# Patient Record
Sex: FEMALE | Race: WHITE
[De-identification: names, ages, dates, MRNs, and addresses within clinical notes are randomized per-mention and may not be internally consistent; named-entity substitution may affect disease eponyms.]

---

## 2017-11-21 NOTE — ULT
PRELIMINARY REPORT/[facility]/EMERGENCY AFTER

HOURS PROCEDURE:

 

EXAM:

US Duplex Left Lower Extremity Veins

 

CLINICAL HISTORY:

27 years old, female; Pain; Leg, upper; Left; Patient HX: HX: Dvt in left calf (2011)

 

TECHNIQUE:

Real-time ultrasound scan of the veins of the left lower extremity with color Doppler flow, spectral 
waveform analysis and compression.

 

COMPARISON:

No relevant prior studies available.

 

FINDINGS:

Deep veins: Unremarkable. No DVT in the visualized common femoral, femoral, proximal deep femoral or 
popliteal veins. The veins demonstrate normal color flow, are normally compressible, with normal phas
ic flow and/or augmentation response.

Superficial veins: Unremarkable. No thrombus in the visualized great saphenous vein.

Soft tissues: No acute findings. No popliteal cyst.

 

IMPRESSION:

No sonographic evidence for deep venous thrombosis in the left lower extremity.

 

Thank you for allowing us to participate in the care of your patient.

 

Dictated and Authenticated by: Hanny Shultz MD

11/21/2017 12:24 AM Central Time ([place] & [place])

 

 

 

                              FINAL REPORT 

 

LEFT LOWER EXTREMITY VENOUS DUPLEX ULTRASOUND INCLUDING COLOR AND SPECTRAL DOPPLER IMAGING:

EMERGENT AFTER HOURS EXAM

 

TIME: 11:48 p.m.

DATE: 11/20/17.

 

Exam performed from groin to ankle including visualized greater saphenous, common femoral, superficia
l femoral, profunda femoral, popliteal, trifurcation, and posterior tibial vein regions.  Phasic flow
 noted at all levels.  No evidence of deep venous thrombosis.

 

POS: JOB

## 2018-06-15 ENCOUNTER — HOSPITAL ENCOUNTER (OUTPATIENT)
Dept: HOSPITAL 92 - BICRAD | Age: 28
Discharge: HOME | End: 2018-06-15
Attending: INTERNAL MEDICINE
Payer: COMMERCIAL

## 2018-06-15 DIAGNOSIS — M47.894: ICD-10-CM

## 2018-06-15 DIAGNOSIS — M54.5: Primary | ICD-10-CM

## 2018-06-15 PROCEDURE — 72072 X-RAY EXAM THORAC SPINE 3VWS: CPT

## 2018-06-15 PROCEDURE — 72100 X-RAY EXAM L-S SPINE 2/3 VWS: CPT

## 2019-03-26 ENCOUNTER — HOSPITAL ENCOUNTER (OUTPATIENT)
Dept: HOSPITAL 92 - BICULT | Age: 29
Discharge: HOME | End: 2019-03-26
Attending: PHYSICIAN ASSISTANT
Payer: COMMERCIAL

## 2019-03-26 DIAGNOSIS — N63.10: Primary | ICD-10-CM

## 2019-03-26 NOTE — ULT
RIGHT BREAST ULTRASOUND:

LEFT BREAST ULTRASOUND:

 

HISTORY:

A 28-year-old female with a breast lump.

 

TECHNIQUE:

Bilateral breast ultrasound was performed.

 

FINDINGS:

There is ductal ectasia noted bilaterally without intraductal mass.

 

There is a 5 mm cyst at the 6 o'clock position of the left breast, 3 cm from the nipple.

 

IMPRESSION:

BI-RADS category 2-Benign findings.  Age appropriate mammographic screening is recommended, based on 
risk factors and clinical findings.

 

POS: [facility]

## 2019-07-31 ENCOUNTER — HOSPITAL ENCOUNTER (OUTPATIENT)
Dept: HOSPITAL 92 - SCSULT | Age: 29
Discharge: HOME | End: 2019-07-31
Attending: FAMILY MEDICINE
Payer: COMMERCIAL

## 2019-07-31 DIAGNOSIS — R60.9: Primary | ICD-10-CM

## 2019-07-31 NOTE — ULT
LEFT LOWER EXTREMITY VENOUS DOPPLER WITH SPECTRAL ANALYSIS AND COLOR FLOW EVALUATION: 

7/31/19

 

HISTORY: 

Left lower extremity edema. History of prior DVT. Patient currently on  Coumadin. 

 

COMPARISON: 

11/20/17.

 

FINDINGS: 

Gray scale, color flow, Doppler evaluation, and spectral analysis of the left lower extremity venous 
structures is performed with 2D imaging. The left lower extremity common femoral, superficial femoral
, popliteal, posterior tibial, most proximal greater saphenous and profunda femoral   veins are image
d. 

 

There is normal lumen compressibility, flow and augmentation of the visualized deep venous structures
 left lower extremity. There has been no interval change compared to prior study in 2017. 

 

IMPRESSION: 

No evidence of a DVT involving the visualized deep venous structures left lower extremity. 

 

POS: [facility]

## 2021-05-06 ENCOUNTER — HOSPITAL ENCOUNTER (OUTPATIENT)
Dept: HOSPITAL 92 - CSHLAB | Age: 31
Discharge: HOME | End: 2021-05-06
Attending: OBSTETRICS & GYNECOLOGY
Payer: COMMERCIAL

## 2021-05-06 DIAGNOSIS — Z20.822: Primary | ICD-10-CM

## 2021-05-06 PROCEDURE — U0005 INFEC AGEN DETEC AMPLI PROBE: HCPCS

## 2021-05-06 PROCEDURE — 87635 SARS-COV-2 COVID-19 AMP PRB: CPT

## 2021-05-06 PROCEDURE — U0003 INFECTIOUS AGENT DETECTION BY NUCLEIC ACID (DNA OR RNA); SEVERE ACUTE RESPIRATORY SYNDROME CORONAVIRUS 2 (SARS-COV-2) (CORONAVIRUS DISEASE [COVID-19]), AMPLIFIED PROBE TECHNIQUE, MAKING USE OF HIGH THROUGHPUT TECHNOLOGIES AS DESCRIBED BY CMS-2020-01-R: HCPCS

## 2021-05-09 ENCOUNTER — HOSPITAL ENCOUNTER (INPATIENT)
Dept: HOSPITAL 92 - CSHLD | Age: 31
LOS: 4 days | Discharge: HOME | End: 2021-05-13
Attending: OBSTETRICS & GYNECOLOGY | Admitting: OBSTETRICS & GYNECOLOGY
Payer: COMMERCIAL

## 2021-05-09 VITALS — BODY MASS INDEX: 33.5 KG/M2

## 2021-05-09 DIAGNOSIS — Z79.01: ICD-10-CM

## 2021-05-09 DIAGNOSIS — Z86.718: ICD-10-CM

## 2021-05-09 DIAGNOSIS — Z3A.39: ICD-10-CM

## 2021-05-09 DIAGNOSIS — Z88.8: ICD-10-CM

## 2021-05-09 DIAGNOSIS — D68.52: ICD-10-CM

## 2021-05-09 DIAGNOSIS — Z20.822: ICD-10-CM

## 2021-05-09 DIAGNOSIS — Z88.1: ICD-10-CM

## 2021-05-09 LAB
HBSAG INDEX: 0.17 S/CO (ref 0–0.99)
HGB BLD-MCNC: 11.7 G/DL (ref 12–15.5)
MCH RBC QN AUTO: 29.7 PG (ref 27–33)
MCV RBC AUTO: 86.3 FL (ref 81.6–98.3)
PLATELET # BLD AUTO: 191 10X3/UL (ref 150–450)
RBC # BLD AUTO: 3.94 10X6/UL (ref 3.9–5.03)
SYPHILIS ANTIBODY INDEX: 0.02 S/CO
WBC # BLD AUTO: 11.5 10X3/UL (ref 3.5–10.5)

## 2021-05-09 PROCEDURE — 87635 SARS-COV-2 COVID-19 AMP PRB: CPT

## 2021-05-09 PROCEDURE — 86901 BLOOD TYPING SEROLOGIC RH(D): CPT

## 2021-05-09 PROCEDURE — 86850 RBC ANTIBODY SCREEN: CPT

## 2021-05-09 PROCEDURE — 86900 BLOOD TYPING SEROLOGIC ABO: CPT

## 2021-05-09 PROCEDURE — U0003 INFECTIOUS AGENT DETECTION BY NUCLEIC ACID (DNA OR RNA); SEVERE ACUTE RESPIRATORY SYNDROME CORONAVIRUS 2 (SARS-COV-2) (CORONAVIRUS DISEASE [COVID-19]), AMPLIFIED PROBE TECHNIQUE, MAKING USE OF HIGH THROUGHPUT TECHNOLOGIES AS DESCRIBED BY CMS-2020-01-R: HCPCS

## 2021-05-09 PROCEDURE — 87340 HEPATITIS B SURFACE AG IA: CPT

## 2021-05-09 PROCEDURE — 36415 COLL VENOUS BLD VENIPUNCTURE: CPT

## 2021-05-09 PROCEDURE — 51702 INSERT TEMP BLADDER CATH: CPT

## 2021-05-09 PROCEDURE — U0005 INFEC AGEN DETEC AMPLI PROBE: HCPCS

## 2021-05-09 PROCEDURE — 86780 TREPONEMA PALLIDUM: CPT

## 2021-05-09 PROCEDURE — 85027 COMPLETE CBC AUTOMATED: CPT

## 2021-05-09 PROCEDURE — 3E0P7VZ INTRODUCTION OF HORMONE INTO FEMALE REPRODUCTIVE, VIA NATURAL OR ARTIFICIAL OPENING: ICD-10-PCS | Performed by: OBSTETRICS & GYNECOLOGY

## 2021-05-09 PROCEDURE — 3E0DXGC INTRODUCTION OF OTHER THERAPEUTIC SUBSTANCE INTO MOUTH AND PHARYNX, EXTERNAL APPROACH: ICD-10-PCS | Performed by: OBSTETRICS & GYNECOLOGY

## 2021-05-10 RX ADMIN — Medication SCH MLS: at 08:57

## 2021-05-10 RX ADMIN — Medication SCH MLS: at 16:51

## 2021-05-10 RX ADMIN — Medication SCH MLS: at 00:56

## 2021-05-10 RX ADMIN — Medication SCH MLS: at 04:58

## 2021-05-10 RX ADMIN — Medication SCH ML: at 10:22

## 2021-05-10 RX ADMIN — Medication SCH MLS: at 13:27

## 2021-05-10 RX ADMIN — Medication SCH ML: at 22:54

## 2021-05-10 RX ADMIN — Medication SCH MLS: at 21:00

## 2021-05-10 RX ADMIN — Medication SCH ML: at 16:34

## 2021-05-11 PROCEDURE — 0KQM0ZZ REPAIR PERINEUM MUSCLE, OPEN APPROACH: ICD-10-PCS | Performed by: OBSTETRICS & GYNECOLOGY

## 2021-05-11 RX ADMIN — Medication SCH: at 07:57

## 2021-05-11 RX ADMIN — HYDROCODONE BITARTRATE AND ACETAMINOPHEN PRN TAB: 5; 325 TABLET ORAL at 18:24

## 2021-05-11 RX ADMIN — HYDROCODONE BITARTRATE AND ACETAMINOPHEN PRN TAB: 5; 325 TABLET ORAL at 09:26

## 2021-05-11 RX ADMIN — DOCUSATE CALCIUM SCH MG: 240 CAPSULE, LIQUID FILLED ORAL at 21:39

## 2021-05-11 RX ADMIN — Medication SCH: at 03:18

## 2021-05-11 RX ADMIN — HYDROCODONE BITARTRATE AND ACETAMINOPHEN PRN TAB: 5; 325 TABLET ORAL at 23:45

## 2021-05-11 RX ADMIN — HYDROCODONE BITARTRATE AND ACETAMINOPHEN PRN TAB: 5; 325 TABLET ORAL at 13:25

## 2021-05-11 RX ADMIN — DOCUSATE CALCIUM SCH MG: 240 CAPSULE, LIQUID FILLED ORAL at 09:24

## 2021-05-12 RX ADMIN — HYDROCODONE BITARTRATE AND ACETAMINOPHEN PRN TAB: 5; 325 TABLET ORAL at 21:49

## 2021-05-12 RX ADMIN — DOCUSATE CALCIUM SCH MG: 240 CAPSULE, LIQUID FILLED ORAL at 21:48

## 2021-05-12 RX ADMIN — DOCUSATE CALCIUM SCH MG: 240 CAPSULE, LIQUID FILLED ORAL at 09:52

## 2021-05-12 RX ADMIN — HYDROCODONE BITARTRATE AND ACETAMINOPHEN PRN TAB: 5; 325 TABLET ORAL at 09:56

## 2021-05-12 RX ADMIN — HYDROCODONE BITARTRATE AND ACETAMINOPHEN PRN TAB: 5; 325 TABLET ORAL at 14:52

## 2021-05-12 RX ADMIN — HYDROCODONE BITARTRATE AND ACETAMINOPHEN PRN TAB: 5; 325 TABLET ORAL at 05:31

## 2021-05-13 VITALS — DIASTOLIC BLOOD PRESSURE: 65 MMHG | SYSTOLIC BLOOD PRESSURE: 117 MMHG | TEMPERATURE: 98.4 F

## 2021-05-13 PROCEDURE — 069Y3ZZ DRAINAGE OF LOWER VEIN, PERCUTANEOUS APPROACH: ICD-10-PCS | Performed by: OBSTETRICS & GYNECOLOGY

## 2021-05-13 RX ADMIN — DOCUSATE CALCIUM SCH MG: 240 CAPSULE, LIQUID FILLED ORAL at 08:30

## 2021-05-13 RX ADMIN — HYDROCODONE BITARTRATE AND ACETAMINOPHEN PRN TAB: 5; 325 TABLET ORAL at 04:45

## 2021-05-13 RX ADMIN — HYDROCODONE BITARTRATE AND ACETAMINOPHEN PRN TAB: 5; 325 TABLET ORAL at 08:30
